# Patient Record
Sex: MALE | Race: BLACK OR AFRICAN AMERICAN | NOT HISPANIC OR LATINO | Employment: FULL TIME | URBAN - METROPOLITAN AREA
[De-identification: names, ages, dates, MRNs, and addresses within clinical notes are randomized per-mention and may not be internally consistent; named-entity substitution may affect disease eponyms.]

---

## 2019-01-23 ENCOUNTER — APPOINTMENT (OUTPATIENT)
Dept: GENERAL RADIOLOGY | Facility: CLINIC | Age: 54
End: 2019-01-23
Payer: COMMERCIAL

## 2019-01-23 ENCOUNTER — APPOINTMENT (OUTPATIENT)
Dept: CT IMAGING | Facility: CLINIC | Age: 54
End: 2019-01-23
Payer: COMMERCIAL

## 2019-01-23 ENCOUNTER — HOSPITAL ENCOUNTER (OUTPATIENT)
Facility: CLINIC | Age: 54
Setting detail: OBSERVATION
Discharge: HOME | End: 2019-01-25
Attending: EMERGENCY MEDICINE | Admitting: HOSPITALIST
Payer: COMMERCIAL

## 2019-01-23 DIAGNOSIS — R55 SYNCOPE, UNSPECIFIED SYNCOPE TYPE: ICD-10-CM

## 2019-01-23 DIAGNOSIS — A09 DIARRHEA OF INFECTIOUS ORIGIN: Primary | ICD-10-CM

## 2019-01-23 LAB
ALBUMIN SERPL-MCNC: 3.7 G/DL (ref 3.4–5)
ALP SERPL-CCNC: 57 U/L (ref 40–150)
ALT SERPL W P-5'-P-CCNC: 44 U/L (ref 0–70)
ANION GAP SERPL CALCULATED.3IONS-SCNC: 6 MMOL/L (ref 3–14)
AST SERPL W P-5'-P-CCNC: 46 U/L (ref 0–45)
BASOPHILS # BLD AUTO: 0 10E9/L (ref 0–0.2)
BASOPHILS NFR BLD AUTO: 0.1 %
BILIRUB DIRECT SERPL-MCNC: 0.2 MG/DL (ref 0–0.2)
BILIRUB SERPL-MCNC: 0.7 MG/DL (ref 0.2–1.3)
BUN SERPL-MCNC: 24 MG/DL (ref 7–30)
CALCIUM SERPL-MCNC: 8.4 MG/DL (ref 8.5–10.1)
CHLORIDE SERPL-SCNC: 107 MMOL/L (ref 94–109)
CO2 SERPL-SCNC: 26 MMOL/L (ref 20–32)
CREAT SERPL-MCNC: 1.42 MG/DL (ref 0.66–1.25)
CRP SERPL-MCNC: 13.2 MG/L (ref 0–8)
D DIMER PPP FEU-MCNC: 0.3 UG/ML FEU (ref 0–0.5)
DIFFERENTIAL METHOD BLD: ABNORMAL
EOSINOPHIL # BLD AUTO: 0.1 10E9/L (ref 0–0.7)
EOSINOPHIL NFR BLD AUTO: 1.6 %
ERYTHROCYTE [DISTWIDTH] IN BLOOD BY AUTOMATED COUNT: 13.6 % (ref 10–15)
ETHANOL SERPL-MCNC: <0.01 G/DL
GFR SERPL CREATININE-BSD FRML MDRD: 56 ML/MIN/{1.73_M2}
GLUCOSE SERPL-MCNC: 95 MG/DL (ref 70–99)
HCT VFR BLD AUTO: 46.4 % (ref 40–53)
HGB BLD-MCNC: 14.6 G/DL (ref 13.3–17.7)
IMM GRANULOCYTES # BLD: 0 10E9/L (ref 0–0.4)
IMM GRANULOCYTES NFR BLD: 0.4 %
LACTATE BLD-SCNC: 1.2 MMOL/L (ref 0.7–2)
LYMPHOCYTES # BLD AUTO: 0.6 10E9/L (ref 0.8–5.3)
LYMPHOCYTES NFR BLD AUTO: 8.6 %
MAGNESIUM SERPL-MCNC: 1.8 MG/DL (ref 1.6–2.3)
MCH RBC QN AUTO: 26.4 PG (ref 26.5–33)
MCHC RBC AUTO-ENTMCNC: 31.5 G/DL (ref 31.5–36.5)
MCV RBC AUTO: 84 FL (ref 78–100)
MONOCYTES # BLD AUTO: 0.4 10E9/L (ref 0–1.3)
MONOCYTES NFR BLD AUTO: 6.3 %
NEUTROPHILS # BLD AUTO: 5.7 10E9/L (ref 1.6–8.3)
NEUTROPHILS NFR BLD AUTO: 83 %
NRBC # BLD AUTO: 0 10*3/UL
NRBC BLD AUTO-RTO: 0 /100
PLATELET # BLD AUTO: 194 10E9/L (ref 150–450)
POTASSIUM SERPL-SCNC: 4.4 MMOL/L (ref 3.4–5.3)
PROT SERPL-MCNC: 7.1 G/DL (ref 6.8–8.8)
RBC # BLD AUTO: 5.52 10E12/L (ref 4.4–5.9)
SODIUM SERPL-SCNC: 139 MMOL/L (ref 133–144)
TROPONIN I SERPL-MCNC: <0.015 UG/L (ref 0–0.04)
WBC # BLD AUTO: 6.9 10E9/L (ref 4–11)

## 2019-01-23 PROCEDURE — 84484 ASSAY OF TROPONIN QUANT: CPT | Performed by: EMERGENCY MEDICINE

## 2019-01-23 PROCEDURE — 85379 FIBRIN DEGRADATION QUANT: CPT | Performed by: EMERGENCY MEDICINE

## 2019-01-23 PROCEDURE — G0378 HOSPITAL OBSERVATION PER HR: HCPCS

## 2019-01-23 PROCEDURE — 99220 ZZC INITIAL OBSERVATION CARE,LEVL III: CPT | Performed by: HOSPITALIST

## 2019-01-23 PROCEDURE — 96361 HYDRATE IV INFUSION ADD-ON: CPT

## 2019-01-23 PROCEDURE — 25000128 H RX IP 250 OP 636: Performed by: EMERGENCY MEDICINE

## 2019-01-23 PROCEDURE — 83735 ASSAY OF MAGNESIUM: CPT | Performed by: EMERGENCY MEDICINE

## 2019-01-23 PROCEDURE — 70450 CT HEAD/BRAIN W/O DYE: CPT

## 2019-01-23 PROCEDURE — 84145 PROCALCITONIN (PCT): CPT | Performed by: EMERGENCY MEDICINE

## 2019-01-23 PROCEDURE — 80076 HEPATIC FUNCTION PANEL: CPT | Performed by: EMERGENCY MEDICINE

## 2019-01-23 PROCEDURE — 86140 C-REACTIVE PROTEIN: CPT | Performed by: EMERGENCY MEDICINE

## 2019-01-23 PROCEDURE — 96360 HYDRATION IV INFUSION INIT: CPT

## 2019-01-23 PROCEDURE — 80320 DRUG SCREEN QUANTALCOHOLS: CPT | Performed by: EMERGENCY MEDICINE

## 2019-01-23 PROCEDURE — 99285 EMERGENCY DEPT VISIT HI MDM: CPT | Mod: 25

## 2019-01-23 PROCEDURE — 93005 ELECTROCARDIOGRAM TRACING: CPT

## 2019-01-23 PROCEDURE — 80048 BASIC METABOLIC PNL TOTAL CA: CPT | Performed by: EMERGENCY MEDICINE

## 2019-01-23 PROCEDURE — 83605 ASSAY OF LACTIC ACID: CPT | Performed by: EMERGENCY MEDICINE

## 2019-01-23 PROCEDURE — 99207 ZZC CDG-CODE CATEGORY CHANGED: CPT | Performed by: HOSPITALIST

## 2019-01-23 PROCEDURE — 71046 X-RAY EXAM CHEST 2 VIEWS: CPT

## 2019-01-23 PROCEDURE — 85025 COMPLETE CBC W/AUTO DIFF WBC: CPT | Performed by: EMERGENCY MEDICINE

## 2019-01-23 RX ORDER — SODIUM CHLORIDE 9 MG/ML
1000 INJECTION, SOLUTION INTRAVENOUS CONTINUOUS
Status: DISCONTINUED | OUTPATIENT
Start: 2019-01-23 | End: 2019-01-24

## 2019-01-23 RX ADMIN — SODIUM CHLORIDE 1000 ML: 9 INJECTION, SOLUTION INTRAVENOUS at 22:22

## 2019-01-23 RX ADMIN — SODIUM CHLORIDE 1000 ML: 9 INJECTION, SOLUTION INTRAVENOUS at 20:00

## 2019-01-23 ASSESSMENT — ENCOUNTER SYMPTOMS
SHORTNESS OF BREATH: 0
LIGHT-HEADEDNESS: 1
DIAPHORESIS: 1
DIZZINESS: 1
FEVER: 0
NAUSEA: 1

## 2019-01-23 NOTE — LETTER
January 25, 2019      Josias Guevara  [address] [id number]        To Whom It May Concern:    Josias Guevara  was seen on 1/25/2019.  Please excuse him during his absence from work on 1/23-1/25/2019 due to illness.        Sincerely,    TERRI Guajardo MD  Hospitalist, Observation Unit

## 2019-01-24 ENCOUNTER — APPOINTMENT (OUTPATIENT)
Dept: CARDIOLOGY | Facility: CLINIC | Age: 54
End: 2019-01-24
Payer: COMMERCIAL

## 2019-01-24 PROBLEM — R55 SYNCOPE: Status: ACTIVE | Noted: 2019-01-24

## 2019-01-24 LAB
ANION GAP SERPL CALCULATED.3IONS-SCNC: 6 MMOL/L (ref 3–14)
BASOPHILS # BLD AUTO: 0 10E9/L (ref 0–0.2)
BASOPHILS NFR BLD AUTO: 0.2 %
BUN SERPL-MCNC: 18 MG/DL (ref 7–30)
C COLI+JEJUNI+LARI FUSA STL QL NAA+PROBE: NOT DETECTED
CALCIUM SERPL-MCNC: 7.8 MG/DL (ref 8.5–10.1)
CHLORIDE SERPL-SCNC: 110 MMOL/L (ref 94–109)
CO2 SERPL-SCNC: 23 MMOL/L (ref 20–32)
CREAT SERPL-MCNC: 1.19 MG/DL (ref 0.66–1.25)
DIFFERENTIAL METHOD BLD: ABNORMAL
EC STX1 GENE STL QL NAA+PROBE: NOT DETECTED
EC STX2 GENE STL QL NAA+PROBE: NOT DETECTED
ENTERIC PATHOGEN COMMENT: ABNORMAL
EOSINOPHIL # BLD AUTO: 0.1 10E9/L (ref 0–0.7)
EOSINOPHIL NFR BLD AUTO: 0.9 %
ERYTHROCYTE [DISTWIDTH] IN BLOOD BY AUTOMATED COUNT: 13.7 % (ref 10–15)
GFR SERPL CREATININE-BSD FRML MDRD: 70 ML/MIN/{1.73_M2}
GLUCOSE SERPL-MCNC: 96 MG/DL (ref 70–99)
HCT VFR BLD AUTO: 40.4 % (ref 40–53)
HGB BLD-MCNC: 12.7 G/DL (ref 13.3–17.7)
IMM GRANULOCYTES # BLD: 0 10E9/L (ref 0–0.4)
IMM GRANULOCYTES NFR BLD: 0.5 %
INTERPRETATION ECG - MUSE: NORMAL
LYMPHOCYTES # BLD AUTO: 1.2 10E9/L (ref 0.8–5.3)
LYMPHOCYTES NFR BLD AUTO: 20.7 %
MCH RBC QN AUTO: 26.2 PG (ref 26.5–33)
MCHC RBC AUTO-ENTMCNC: 31.4 G/DL (ref 31.5–36.5)
MCV RBC AUTO: 83 FL (ref 78–100)
MONOCYTES # BLD AUTO: 0.6 10E9/L (ref 0–1.3)
MONOCYTES NFR BLD AUTO: 10.3 %
NEUTROPHILS # BLD AUTO: 3.8 10E9/L (ref 1.6–8.3)
NEUTROPHILS NFR BLD AUTO: 67.4 %
NOROV GI+II ORF1-ORF2 JNC STL QL NAA+PR: ABNORMAL
NRBC # BLD AUTO: 0 10*3/UL
NRBC BLD AUTO-RTO: 0 /100
PLATELET # BLD AUTO: 160 10E9/L (ref 150–450)
POTASSIUM SERPL-SCNC: 3.9 MMOL/L (ref 3.4–5.3)
PROCALCITONIN SERPL-MCNC: 0.08 NG/ML
RBC # BLD AUTO: 4.85 10E12/L (ref 4.4–5.9)
RVA NSP5 STL QL NAA+PROBE: NOT DETECTED
SALMONELLA SP RPOD STL QL NAA+PROBE: NOT DETECTED
SHIGELLA SP+EIEC IPAH STL QL NAA+PROBE: NOT DETECTED
SODIUM SERPL-SCNC: 139 MMOL/L (ref 133–144)
TROPONIN I SERPL-MCNC: <0.015 UG/L (ref 0–0.04)
V CHOL+PARA RFBL+TRKH+TNAA STL QL NAA+PR: NOT DETECTED
WBC # BLD AUTO: 5.6 10E9/L (ref 4–11)
Y ENTERO RECN STL QL NAA+PROBE: NOT DETECTED

## 2019-01-24 PROCEDURE — 25000132 ZZH RX MED GY IP 250 OP 250 PS 637: Performed by: INTERNAL MEDICINE

## 2019-01-24 PROCEDURE — 25000132 ZZH RX MED GY IP 250 OP 250 PS 637: Performed by: HOSPITALIST

## 2019-01-24 PROCEDURE — 87506 IADNA-DNA/RNA PROBE TQ 6-11: CPT | Performed by: HOSPITALIST

## 2019-01-24 PROCEDURE — 93306 TTE W/DOPPLER COMPLETE: CPT | Mod: 26 | Performed by: INTERNAL MEDICINE

## 2019-01-24 PROCEDURE — 93306 TTE W/DOPPLER COMPLETE: CPT

## 2019-01-24 PROCEDURE — 25000128 H RX IP 250 OP 636: Performed by: HOSPITALIST

## 2019-01-24 PROCEDURE — 99226 ZZC SUBSEQUENT OBSERVATION CARE,LEVEL III: CPT | Performed by: INTERNAL MEDICINE

## 2019-01-24 PROCEDURE — G0378 HOSPITAL OBSERVATION PER HR: HCPCS

## 2019-01-24 PROCEDURE — 84484 ASSAY OF TROPONIN QUANT: CPT | Performed by: HOSPITALIST

## 2019-01-24 PROCEDURE — 80048 BASIC METABOLIC PNL TOTAL CA: CPT | Performed by: HOSPITALIST

## 2019-01-24 PROCEDURE — 36415 COLL VENOUS BLD VENIPUNCTURE: CPT | Performed by: HOSPITALIST

## 2019-01-24 PROCEDURE — 40000893 ZZH STATISTIC PT IP EVAL DEFER: Performed by: PHYSICAL THERAPIST

## 2019-01-24 PROCEDURE — 85025 COMPLETE CBC W/AUTO DIFF WBC: CPT | Performed by: HOSPITALIST

## 2019-01-24 PROCEDURE — 25000128 H RX IP 250 OP 636: Performed by: INTERNAL MEDICINE

## 2019-01-24 RX ORDER — ACETAMINOPHEN 325 MG/1
650 TABLET ORAL EVERY 4 HOURS PRN
Status: DISCONTINUED | OUTPATIENT
Start: 2019-01-24 | End: 2019-01-25 | Stop reason: HOSPADM

## 2019-01-24 RX ORDER — ACETAMINOPHEN 650 MG/1
650 SUPPOSITORY RECTAL EVERY 4 HOURS PRN
Status: DISCONTINUED | OUTPATIENT
Start: 2019-01-24 | End: 2019-01-25 | Stop reason: HOSPADM

## 2019-01-24 RX ORDER — ONDANSETRON 2 MG/ML
4 INJECTION INTRAMUSCULAR; INTRAVENOUS EVERY 6 HOURS PRN
Status: DISCONTINUED | OUTPATIENT
Start: 2019-01-24 | End: 2019-01-25 | Stop reason: HOSPADM

## 2019-01-24 RX ORDER — SODIUM CHLORIDE, SODIUM LACTATE, POTASSIUM CHLORIDE, CALCIUM CHLORIDE 600; 310; 30; 20 MG/100ML; MG/100ML; MG/100ML; MG/100ML
INJECTION, SOLUTION INTRAVENOUS CONTINUOUS
Status: DISCONTINUED | OUTPATIENT
Start: 2019-01-24 | End: 2019-01-25 | Stop reason: HOSPADM

## 2019-01-24 RX ORDER — ONDANSETRON 4 MG/1
4 TABLET, ORALLY DISINTEGRATING ORAL EVERY 6 HOURS PRN
Status: DISCONTINUED | OUTPATIENT
Start: 2019-01-24 | End: 2019-01-25 | Stop reason: HOSPADM

## 2019-01-24 RX ORDER — NALOXONE HYDROCHLORIDE 0.4 MG/ML
.1-.4 INJECTION, SOLUTION INTRAMUSCULAR; INTRAVENOUS; SUBCUTANEOUS
Status: DISCONTINUED | OUTPATIENT
Start: 2019-01-24 | End: 2019-01-25 | Stop reason: HOSPADM

## 2019-01-24 RX ORDER — LACTOBACILLUS RHAMNOSUS GG 10B CELL
1 CAPSULE ORAL
Status: DISCONTINUED | OUTPATIENT
Start: 2019-01-24 | End: 2019-01-25 | Stop reason: HOSPADM

## 2019-01-24 RX ADMIN — SODIUM CHLORIDE, POTASSIUM CHLORIDE, SODIUM LACTATE AND CALCIUM CHLORIDE: 600; 310; 30; 20 INJECTION, SOLUTION INTRAVENOUS at 01:17

## 2019-01-24 RX ADMIN — Medication 1 CAPSULE: at 21:25

## 2019-01-24 RX ADMIN — ACETAMINOPHEN 650 MG: 325 TABLET, FILM COATED ORAL at 16:54

## 2019-01-24 RX ADMIN — ACETAMINOPHEN 650 MG: 325 TABLET, FILM COATED ORAL at 01:31

## 2019-01-24 RX ADMIN — SODIUM CHLORIDE, POTASSIUM CHLORIDE, SODIUM LACTATE AND CALCIUM CHLORIDE: 600; 310; 30; 20 INJECTION, SOLUTION INTRAVENOUS at 08:00

## 2019-01-24 RX ADMIN — SODIUM CHLORIDE, POTASSIUM CHLORIDE, SODIUM LACTATE AND CALCIUM CHLORIDE: 600; 310; 30; 20 INJECTION, SOLUTION INTRAVENOUS at 23:10

## 2019-01-24 ASSESSMENT — MIFFLIN-ST. JEOR: SCORE: 1877.77

## 2019-01-24 NOTE — PLAN OF CARE
"PRIMARY DIAGNOSIS: GASTROENTERITIS/SYNCOPE     OUTPATIENT/OBSERVATION GOALS TO BE MET BEFORE DISCHARGE  1. Orthostatic performed: Yes:          Lying Orthostatic BP: 130/75         Sitting Orthostatic BP: 122/72         Standing Orthostatic BP: 113/74     2. Tolerating PO fluid and/or antibiotics (if applicable): Yes    3. Nausea/Vomiting/Diarrhea symptoms improved: Yes, loose stool x1 this am.     4. Pain status: Pain free.    5. Return to near baseline physical activity: Yes    Discharge Planner Nurse   Safe discharge environment identified: Yes  Barriers to discharge: Yes, Neurology consult        Entered by: Samia Alarcon 01/24/2019 2:30 PM   Pt alert and orientated. VSS. Wanted to eat regular food--order placed. No pain. No diarrhea. Stated \"I feel better\". Will continue to monitor.   Please review provider order for any additional goals.   Nurse to notify provider when observation goals have been met and patient is ready for discharge.  "

## 2019-01-24 NOTE — CONSULTS
Neurology Consult Note  [facility]       [2019]                                                                                       Admission Date: 2019  Hospital Day: 2  Code Status: [unfilled]    Patient: Josias Guevara      : 1965  MRN:  [id number]     CC:      Chief Complaint   Patient presents with     Loss of Consciousness       Consult Request:  Referring Provider:  Nelson Crump MD    Indication for Consultation: Neurology IP Consult: General (non-stroke/non-ICU); Patient to be seen: Routine - within 24 hours; Please eval. re. syncope and CT finding of communicating ventriculomegaly; please review this finding with family, if able.  Thanks, ALEC Guajardo MD.      Primary Care Provider:  Karan Vieira MD        HPI:  Josias Guevara is a 53 year old yo XH male admitted for syncope. It was felt that he was dehydrated, and suffered hypotensive syncope. Admission EKG shows mild bradycardia but no electrophysiological pathology. Pulse at the scene (by EMS) was recorded as bradycardia, somewhat unusual in the setting of acute hypotensive syncope. He underwent head CT on admission to the ER, showing mild generalized enlargement of his ventricular system, but without evidence of acute increased ICP (no transependymal edema at his ventricular horns). I have been asked to explain his head CT findings to him. I previously spoke with Dr. Be in the ER, indicating that the imaging results were likely reflecting a developmental process, found incidentally on his imaging, and not related to his syncope. He denies headache around the time of the syncope, and has no symptoms or findings of infection. He reports intermittent headache in the hospital, occurring when sitting up, resolving lying down, inconsistent with headache arising from increased ICP.    A complete review of symptoms was performed including vascular, infectious, cardiovascular,  "pulmonary, gastrointestinal, endocrinological, hematologic, dermatologic, musculoskeletal, and neurological. All were normal except as above.    CURRENT PROBLEM LIST:  Patient Active Problem List    Diagnosis Date Noted     Syncope 01/24/2019     Priority: Medium     Rhabdomyolysis 07/14/2013     Priority: Medium       PAST MEDICAL HISTORY:  ALLERGIES: No Known Allergies  Tobacco:    History   Smoking Status     Never Smoker   Smokeless Tobacco     Not on file     Alcohol:  Social History    Substance and Sexual Activity      Alcohol use: Not on file    MEDICATIONS:       CURRENTLY SCHEDULED MEDICATIONS          HOME MEDICATIONS  No medications prior to admission.     MEDICAL HISTORY  History reviewed. No pertinent past medical history.  SURGICAL HISTORY  Past Surgical History:   Procedure Laterality Date     ORTHOPEDIC SURGERY      achilles repair     FAMILY HISTORY    No family history on file.  SOCIAL HISTORY  Social History     Socioeconomic History     Marital status:      Spouse name: None     Number of children: None     Years of education: None     Highest education level: None   Social Needs     Financial resource strain: None     Food insecurity - worry: None     Food insecurity - inability: None     Transportation needs - medical: None     Transportation needs - non-medical: None   Occupational History     None   Tobacco Use     Smoking status: Never Smoker   Substance and Sexual Activity     Alcohol use: None     Drug use: None     Sexual activity: None   Other Topics Concern     None   Social History Narrative     None         GENERAL EXAMINATION:    He is a middle-aged , well-developed, well-nourished man, 5' 11\" and 220 lbs 9.6 oz. Blood pressure is 124/74. Peripheral pulses are intact at 68 and regular. He has no carotid bruits. Conjunctiva are normal. His oropharynx is without lesions or inflammation. Skin examination is unremarkable. Nail examination shows no clubbing, cyanosis, or " "deformities. Respiratory examination is unremarkable, with rate of 16, unlabored. He is afebrile.         Height: 180.3 cm (5' 11\")     Temp: 98.8  F (37.1  C)   Weight: 100.1 kg (220 lb 9.6 oz)    Temp src: Oral         BP: 124/74   Heart Rate: 58     Estimated body mass index is 30.77 kg/m  as calculated from the following:    Height as of this encounter: 1.803 m (5' 11\").    Weight as of this encounter: 100.1 kg (220 lb 9.6 oz).    Resp: 16   SpO2: 96 %   O2 Device: None (Room air)     Blood Pressure:   BP Readings from Last 3 Encounters:   19 124/74   13 145/81   09 110/74       T24 : Temp (24hrs), Av.2  F (37.3  C), Min:98.8  F (37.1  C), Max:100.2  F (37.9  C)       NEUROLOGICAL EXAMINATION  Mental Status:  He is awake, alert, and oriented X3. His speech is spontaneous and fluent. He has no aphasia or dysarthria. Short- and long-term memory are intact. Fund of knowledge is appropriate.     Station and Gait: He has a narrow-based gait with free arm swings.     Skull and Spine: His head is atraumatic. His spine is non-tender to palpation. He has no cervical sensory level. He has no tenderness to palpation over his lumbar spine.     Cranial Nerves: Visual fields are full. Extraocular muscles are intact. Pupils are reactive to light. His face is symmetric at rest and with movement. He has no ptosis.  Hearing is intact.    Motor: Muscle bulk is preserved. He has no focal weakness proximally or distally in his arms or legs. He feels that he is at his baseline    Sensory: Sensation is symmetric in his arms and legs.    Coordination: He has no resting, postural, or action tremor.       .  LABORATORY RESULTS      SMA-7:  Recent Labs   Lab Test 19  0631 19  1832 13  0710 07/15/13  0613    139 140 142   POTASSIUM 3.9 4.4 4.2 3.5   CHLORIDE 110* 107 105 105   CO2 23 26 30 30   GLC 96 95 95 104*   BUN 18 24 10 18   CR 1.19 1.42* 1.16 1.32*   ELAINE 7.8* 8.4* 8.1* 7.9*     Recent " Labs   Lab Test 01/23/19  1832   MAG 1.8     No results for input(s): PHOS in the last 27215 hours.1 mg (actual weight)  CMP:  Recent Labs   Lab 01/23/19  1832   PROTTOTAL 7.1   ALBUMIN 3.7   ALKPHOS 57   AST 46*   ALT 44   BILITOTAL 0.7     CBC:    Recent Labs   Lab 01/24/19  0631 01/23/19  1832   WBC 5.6 6.9   RBC 4.85 5.52   HGB 12.7* 14.6   HCT 40.4 46.4   MCV 83 84    194     No results for input(s): IRON, IRONSAT, RETICABSCT, RETP, FEB, NASIM, FOLIC, EPOE, MORPH in the last 168 hours.  U/A:    Recent Labs   Lab Test 07/14/13 2015   COLOR Yellow   APPEARANCE Clear   URINEGLC Negative   URINEBILI Negative   URINEKETONE 10*   SG 1.016   UBLD Trace*   URINEPH 5.5   PROTEIN Negative   NITRITE Negative   LEUKEST Negative   RBCU 0   WBCU 0     No results found for: MICROALBUMIN, CREATCONC    Cholesterol Panel: No results found for: CHOL, HDL, LDL, TRIG, CHOLHDLRATIO, VLDL  Lipase: No results found for: LIPASE  HgA1c: No results found for: A1C  TSH: No results for input(s): TSH in the last 168 hours.  CK: No results found for: CKTOTAL  No results found for: CKTOTAL, CKMB, TROPN  Ammonia:  No lab results found.  Vitamin B12: No lab results found.  Homocysteine: No results found for: HOMOCYSTEINE  Vitamin D: No results for input(s): VITDT in the last 168 hours.  RPR: @LABALLVALUES(RPR:1)@  ESR: No lab results found.  CRP:   Lab Results   Component Value Date    CRP 13.2 01/23/2019     JARED: No lab results found.    ANCA: No lab results found.   No results for input(s): NTBNPI, NTBNP in the last 168 hours.  Recent Labs   Lab 01/24/19  1018   TROPI <0.015     Lab Results   Component Value Date    TROPI <0.015 01/24/2019    TROPI <0.015 01/24/2019    TROPI <0.015 01/24/2019        COAGULATION PANEL  --Lupus anticoagulant:  No results found for: LUPINT  --MTHFR:   No results found for: PATH  --Antithrombin III:  No results found for: ANTCH  --FIbrinogen -  --Factor 7 -  --Factor 8 -  --Protein S free:   No results  found for: PSF  --Protein C:   No results found for: PCCH  --Activated Protein C  No results found for: ARPCR  No results found for: ARPCN  No results found for: APCINT  --Cardiolipin antibodies   No results found for: CARG  No results found for: LISANDRO  --Beta-2 glycoprotein antibodies   No results found for: B2IGG  No results found for: B2IGM  --CRP Cardiac Risk:    Lab Results   Component Value Date    CRP 13.2 (H) 01/23/2019     --Homocysteine:  No results found for: HOMOCYSTEINE  --Methylmalonic acid:  No components found for: METHYLAMOLINC ACID  --Platelet Function P2Y12: No results found for: PRU  --Platelet Function ASA: No results found for: ASA  INR:  No lab results found in last 7 days.    ABG: No lab results found in last 7 days.  Blood Cultures: No results for input(s): CULT in the last 168 hours.    No results found for: CTYP   No results found for: CSFPROTEIN   No components found for: CGLU1     Depakote level:   No lab results found.  Dilantin Level:    No lab results found.  Phenobarbital Level:   No lab results found.  Lamotrigine  Level:    No lab results found.  Lamotrigine Free Level:   No lab results found.  Tegretol level:    No lab results found.  Keppra  Level:    No lab results found.    Ethanol Level: @LABALLVALUES(ETOH:1)@    IMAGING RESULTS   Xr Chest 2 Views    Result Date: 1/23/2019  CHEST TWO VIEWS   1/23/2019 7:29 PM HISTORY: Syncope COMPARISON: None.     IMPRESSION: No acute abnormality. Lungs are well-inflated and clear. Heart size is normal. CONY HARRIS MD    Ct Head W/o Contrast    Result Date: 1/23/2019  CT SCAN OF THE HEAD WITHOUT CONTRAST   1/23/2019 7:28 PM HISTORY: Headache, syncope. TECHNIQUE:  Axial images of the head and coronal reformations without IV contrast material. Radiation dose for this scan was reduced using automated exposure control, adjustment of the mA and/or kV according to patient size, or iterative reconstruction technique. COMPARISON: None. FINDINGS:  The bilateral lateral, third, and fourth ventricles are mildly enlarged out of proportion to the degree of sulcal enlargement. The cerebral hemispheres, brainstem, and cerebellum otherwise demonstrate normal morphology and attenuation. No evidence of acute ischemia, hemorrhage, mass, mass effect, or hydrocephalus. The visualized calvarium, skull base, paranasal sinuses, and extracranial soft tissues are unremarkable.     IMPRESSION: Nonspecific mild communicating ventriculomegaly, age indeterminate. If there is concern for meningitis, lumbar puncture could be pursued. CONY HARRIS MD      Recent Results (from the past 24 hour(s))   CT Head w/o Contrast    Narrative    CT SCAN OF THE HEAD WITHOUT CONTRAST   1/23/2019 7:28 PM     HISTORY: Headache, syncope.    TECHNIQUE:  Axial images of the head and coronal reformations without  IV contrast material. Radiation dose for this scan was reduced using  automated exposure control, adjustment of the mA and/or kV according  to patient size, or iterative reconstruction technique.    COMPARISON: None.    FINDINGS: The bilateral lateral, third, and fourth ventricles are  mildly enlarged out of proportion to the degree of sulcal enlargement.  The cerebral hemispheres, brainstem, and cerebellum otherwise  demonstrate normal morphology and attenuation. No evidence of acute  ischemia, hemorrhage, mass, mass effect, or hydrocephalus. The  visualized calvarium, skull base, paranasal sinuses, and extracranial  soft tissues are unremarkable.      Impression    IMPRESSION: Nonspecific mild communicating ventriculomegaly, age  indeterminate. If there is concern for meningitis, lumbar puncture  could be pursued.    CONY HARRIS MD   XR Chest 2 Views    Narrative    CHEST TWO VIEWS   1/23/2019 7:29 PM     HISTORY: Syncope    COMPARISON: None.      Impression    IMPRESSION: No acute abnormality. Lungs are well-inflated and clear.  Heart size is normal.    CONY HARRIS MD        ____________________________________________________________________________    EKG:        ASSESSMENT     1. Syncope.   2. Mild ventriculomegaly on head CT, likely a developmental finding, not related to his syncope.  3. Bradycardia. Rule out bradyarrhythmia causing/contributing to his syncope.    RECOMMENDATIONS   1. I don't feel that he requires additional imaging at this time.  2. I don't believe his headaches are related to his enlarged ventricles. Increased ICP would cause headaches lying down, relieved sitting up, opposite to his symptoms.  3. He should be safe for discharge from the neurological perspective.   4. Consider outpatient 30 day event monitoring.        Rios Galicia M.D., Ph.D.    [facility]

## 2019-01-24 NOTE — ED PROVIDER NOTES
History     Chief Complaint:  Syncopal Episode    HPI   Josias Guevara is a 53 year old male who presents via EMS after syncopal episode. The patient reports that he was sitting in the [employer] shop when he had sudden onset of dizziness, lightheadedness and diaphoresis towards the end of his haircut. He subsequently had a syncopal episode and EMS was called to transport him to the ED for evaluation. Upon regain consciousness he complained of having some nausea as well. He notes that he has not had anything to eat today, and has only had coffee. He is afebrile here and denies having any chest pain or shortness of breath. Of note, he reports his father had a heart attack in his 40's.    Allergies:  No known drug allergies    Medications:    The patient is currently on no regular medications.    Past Medical History:    Rhabdomyolysis    Past Surgical History:    Achilles repair    Family History:    Heart attack  Cancer  Brain aneurysm      Social History:  The patient presents to the ED via EMS. Family present at bedside.  Marital status:   Smoking status: Never Smoker  Alcohol use: No    Review of Systems   Constitutional: Positive for diaphoresis. Negative for fever.   Respiratory: Negative for shortness of breath.    Cardiovascular: Negative for chest pain.   Gastrointestinal: Positive for nausea.   Neurological: Positive for dizziness, syncope and light-headedness.   All other systems reviewed and are negative.    Physical Exam     Patient Vitals for the past 24 hrs:   BP Temp Temp src Pulse Heart Rate Resp SpO2   01/23/19 2215 134/73 -- -- -- -- -- --   01/23/19 2130 120/76 -- -- 60 -- -- 98 %   01/23/19 2115 123/79 -- -- 64 -- -- --   01/23/19 2112 -- 98.9  F (37.2  C) Oral -- -- -- --   01/23/19 2100 126/76 -- -- 64 -- -- --   01/23/19 2045 126/73 -- -- 65 -- -- --   01/23/19 2030 121/76 -- -- 65 -- -- 97 %   01/23/19 2015 124/76 -- -- 61 -- -- 98 %   01/23/19 2000 113/74 -- -- 62 -- -- 98 %    01/23/19 1915 119/81 -- -- 58 56 12 98 %   01/23/19 1900 126/78 -- -- 60 63 15 99 %   01/23/19 1845 -- -- -- 58 58 (!) 34 100 %   01/23/19 1833 -- 99.7  F (37.6  C) -- -- -- -- --   01/23/19 1830 -- -- -- 59 63 21 98 %   01/23/19 1819 126/80 -- -- -- -- -- --   01/23/19 1816 -- -- -- -- 58 16 98 %   01/23/19 1815 126/80 -- -- 60 -- -- 98 %       Physical Exam  General: Patient is alert and interactive when I enter the room  Head:  The scalp, face, and head appear normal  Eyes:  Conjunctivae are normal, PERRL  ENT:    The nose is normal    Pinnae are normal    External acoustic canals are normal    Dry mucous membranes   Neck:  Trachea midline, good ROM, no nuchal rigidity   CV:  Pulses are normal, RRR  Resp:  No respiratory distress, CTAB   Abdomen:      Soft, non-tender, non-distended  Musc:  Normal muscular tone    No major joint effusions    No asymmetric leg swelling  Skin:  No rash or lesions noted  Neuro:  Speech is normal and fluent. Face is symmetric. Slow to respond. No limb ataxia. Gait intact. No pronator drift.     Moving all extremities well.   Psych: Awake. Alert.  Normal affect.  Appropriate interactions.    Emergency Department Course   ECG (18:18:41):  Rate 59 bpm. HI interval 168. QRS duration 90. QT/QTc 378/374. P-R-T axes 23 12 10. Sinus bradycardia. Otherwise normal ECG. Interpreted at 1828 by Britta Be MD.    Imaging:  Radiographic findings were communicated with the patient who voiced understanding of the findings.    CT HEAD W/O CONTRAST:  IMPRESSION: Nonspecific mild communicating ventriculomegaly, age  indeterminate. If there is concern for meningitis, lumbar puncture  could be pursued.  As read by Radiology.    XR CHEST 2 VIEWS:  IMPRESSION: No acute abnormality. Lungs are well-inflated and clear.  Heart size is normal.  As read by Radiology.     Laboratory:  CBC: WNL (WBC 6.9, HGB 14.6, )  BMP: Creatinine 1.42 (H), GFR Estimate 56 (L), Calcium 8.4 (L), o/w WNL  Troponin:  <0.015  D Dimer: 0.3  Lactic Acid Whole Blood: 1.2    Interventions:  2000: NS 1L IV Bolus  2222: NS 0.9% Infusion 1L IV    Emergency Department Course:  The patient arrived in the emergency department via EMS.    Past medical records, nursing notes, and vitals reviewed.  1829: I performed an exam of the patient and obtained history, as documented above.   IV inserted and blood samples were collected and sent for laboratory testing, findings above.  The patient was sent for a head CT and chest x-ray while in the emergency department, findings above.    2005: I rechecked the patient and explained the findings.    2102: I rechecked the patient.    2118: I spoke to Dr. Rios Galicia at the [facility] regarding the patient.    2130: I updated the patient.    2148: I rechecked the patient.    I spoke to Dr. Crump of the hospitalist service who accepts the patient for admission.     Findings and plan explained to the patient who consents to admission.     Discussed the patient with Dr. Crump, who will admit the patient to an observation bed for further monitoring, evaluation, and treatment.      Impression & Plan      Medical Decision Making:  Josias Guevara is a 53 male who presents with syncope. Patient seemed to have a vasovagal syncope. He had nausea and dizziness before his syncopal episode. His EKG revealed mild sinus bradycardia at 50. Orthostatics were positive as he dropped to 90 SBP with standing. Patient's family are sick with a likely gastroenteritis and patient has had multiple episodes of diarrhea. He did not eat or drink today either. It seems likely that patient was dehydrated and this was his cause of his syncope. CBC was normal but has a mild SOULEYMANE which is likely related to his dehydration. With his family history of his mother having a brain aneurysm, a head CT was done. Patient denied a headache. Head CT report showed possible mild communicating ventriculomegaly which could be  caused by meningitis. However, patient is afebrile, no leukocytosis and no nuchal rigidity. He intermittently report a headache but it was not severe and just mild per patient. I discussed with Neurology the CT findings and stated this is an abnormality that will need follow-up but unlikely to be the cause of his symptoms. If was indeed meningitis, would expect more pronounced symptoms and he likely would be unconscious if he had meninigitis induced hydrocephalus. Patient was tired and wife was concerned that he was not acting normal. He was able to ambulate and laugh with me. With the abnormal CT findings and given that he is not completely back to baseline, patient will be admitted to obs. Patient most likely has dehydration from gastroenteritis causing his syncope.     Diagnosis:    ICD-10-CM    1. Syncope, unspecified syncope type R55        Disposition:  Admitted to observation in the care of Dr. Theron Burger  1/23/2019   [facility] EMERGENCY DEPARTMENT  I, Manisha Burger, am serving as a scribe at 6:29 PM on 1/23/2019 to document services personally performed by Britta Be MD based on my observations and the provider's statements to me.        Britta Be MD  01/24/19 0029

## 2019-01-24 NOTE — H&P
[facility]    History and Physical  Hospitalist     Date of Admission:  1/23/2019  Date of Service (when I saw the patient): 01/23/19  Provider: Nelson Crump MD      Chief Complaint   Fainting episode    History is obtained from the patient, electronic health record and emergency department physician    History of Present Illness   Josias Guevara is a 53 year old male healthy, not on any medications who exercises regularly.  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  He felt nauseated, with dizziness and lost consciousness.  When he woke up he was sweaty, diaphoretic.  He denies any warning signs.  No prior history.  His wife had recently acute diarrhea, his son is also having diarrhea and the patient himself started to have diarrhea today.  He reports of having 5 or 6 episodes of diarrhea since the morning.  He was not able to drink or eat anything, he feels upsetting in his stomach.  He denies fever.  No chest pain, no shortness of breath.  No urinary issues.  From my conversation with emergency department physician, on arrival he was significantly hypotensive in the orthostatic position, unable to tolerate it.  He is receiving IV fluid, feeling better but not at his baseline.  At the moment of my interview he denies any pain.  He is a still feeling weak.  He seems to be somnolent, participate in the interview but falls asleep easily.  His wife accompanying him in the emergency department.  I gather some of the information from her.  His laboratory workup is significant for:  CBC is normal except for absolute lymphocyte count 0.6.  Chemistry significant for creatinine 1.42 glomerular filtration rate 56 calcium 8.4 otherwise sodium, potassium, chloride and bicarbonate are within normal limits.  Lactic acid is normal.  Troponin nondetectable.  Glycemia 95.  D-dimer 0.3.  UA with fasting ketosis, trace of blood.  Ethanol is nondetectable.  CT of the  head.   IMPRESSION: Nonspecific mild communicating ventriculomegaly, age  indeterminate. If there is concern for meningitis, lumbar puncture  could be pursued.  EKG  Sinus bradycardia, rate 59.  Requested and in process pro calcitonin, CRP, hepatic panel and enteric virus or bacterial panel.    Past Medical History    Rhabdomyolysis from overexercising 2013.  Shingles in November 2018.    Assessment & Plan   Josias Guevara is a 53 year old male who presents after an episode of syncope in the setting of ongoing diarrhea.    1.  Syncope.  Suspect related to dehydration in the setting of ongoing diarrhea.  Other causes are not totally excluded though have not been proven.  -Admit to telemetry, complete serial troponin and obtain echocardiogram in the morning.    2.  Acute diarrhea (household contact with similar symptoms).  Suspect viral infectious acute gastroenteritis with moderate to severe dehydration as the main reason behind his symptoms.   -Enteric isolation.  Advance diet as tolerated.  Fluid resuscitation with Ringer lactate.  3.  Acute kidney injury.  Secondary to severe dehydration.   -Fluid resuscitation follow-up BMP in the morning.  4.  Incidental finding of communicating ventriculomegaly.  No clear causal relationship with his current presentation.  This may need further follow-up in the outpatient.    Code Status   Full Code    Primary Care Physician   Karan Vieira      Past Surgical History   I have reviewed this patient's surgical history and updated it with pertinent information if needed.  Past Surgical History:   Procedure Laterality Date     ORTHOPEDIC SURGERY      achilles repair       Prior to Admission Medications   None     Allergies   No Known Allergies    Social History   I have personally reviewed the social history with the patient showing.  Social History     Tobacco Use     Smoking status: Never Smoker   Substance Use Topics     Alcohol use: Not on file     Family History    Father  from heart attack, mother had brain aneurysm.      Review of Systems   Except as noted in the HPI, a 12-system Review of Systems was found to be negative.      Physical Exam   Vital Signs with Ranges  Temp:  [98.9  F (37.2  C)-99.7  F (37.6  C)] 98.9  F (37.2  C)  Pulse:  [58-65] 60  Heart Rate:  [56-63] 56  Resp:  [12-34] 12  BP: (113-134)/(73-81) 134/73  SpO2:  [97 %-100 %] 98 %  0 lbs 0 oz    GEN:  Somnolent, cooperative oriented x 3, appears comfortable, NAD.  HEENT:  Normocephalic/atraumatic, no scleral icterus, no nasal discharge, mouth moist.  CV:  Regular rate and rhythm, no murmur or JVD.  S1 + S2 noted, no S3 or S4.  LUNGS:  Clear to auscultation bilaterally without rales/rhonchi/wheezing/retractions.  Symmetric chest rise on inhalation noted.  ABD:  Active bowel sounds, soft, non-tender/non-distended.  No rebound/guarding/rigidity.  EXT:  No edema or cyanosis.  No joint synovitis noted.  SKIN:  Dry to touch, no exanthems noted in the visualized areas.  NEURO: Follows commands, speech is articulated and motility preserved. No nuchal stiffness or meningeal signs.     Data   I personally reviewed the EKG tracing showing Sinus bradycardia, heart rate 59..  Results for orders placed or performed during the hospital encounter of 19 (from the past 24 hour(s))   EKG 12 lead   Result Value Ref Range    Interpretation ECG Click View Image link to view waveform and result    Troponin I   Result Value Ref Range    Troponin I ES <0.015 0.000 - 0.045 ug/L   CBC with platelets differential   Result Value Ref Range    WBC 6.9 4.0 - 11.0 10e9/L    RBC Count 5.52 4.4 - 5.9 10e12/L    Hemoglobin 14.6 13.3 - 17.7 g/dL    Hematocrit 46.4 40.0 - 53.0 %    MCV 84 78 - 100 fl    MCH 26.4 (L) 26.5 - 33.0 pg    MCHC 31.5 31.5 - 36.5 g/dL    RDW 13.6 10.0 - 15.0 %    Platelet Count 194 150 - 450 10e9/L    Diff Method Automated Method     % Neutrophils 83.0 %    % Lymphocytes 8.6 %    % Monocytes 6.3 %    %  Eosinophils 1.6 %    % Basophils 0.1 %    % Immature Granulocytes 0.4 %    Nucleated RBCs 0 0 /100    Absolute Neutrophil 5.7 1.6 - 8.3 10e9/L    Absolute Lymphocytes 0.6 (L) 0.8 - 5.3 10e9/L    Absolute Monocytes 0.4 0.0 - 1.3 10e9/L    Absolute Eosinophils 0.1 0.0 - 0.7 10e9/L    Absolute Basophils 0.0 0.0 - 0.2 10e9/L    Abs Immature Granulocytes 0.0 0 - 0.4 10e9/L    Absolute Nucleated RBC 0.0    Basic metabolic panel   Result Value Ref Range    Sodium 139 133 - 144 mmol/L    Potassium 4.4 3.4 - 5.3 mmol/L    Chloride 107 94 - 109 mmol/L    Carbon Dioxide 26 20 - 32 mmol/L    Anion Gap 6 3 - 14 mmol/L    Glucose 95 70 - 99 mg/dL    Urea Nitrogen 24 7 - 30 mg/dL    Creatinine 1.42 (H) 0.66 - 1.25 mg/dL    GFR Estimate 56 (L) >60 mL/min/[1.73_m2]    GFR Estimate If Black 65 >60 mL/min/[1.73_m2]    Calcium 8.4 (L) 8.5 - 10.1 mg/dL   Lactic acid whole blood   Result Value Ref Range    Lactic Acid 1.2 0.7 - 2.0 mmol/L   D dimer quantitative   Result Value Ref Range    D Dimer 0.3 0.0 - 0.50 ug/ml FEU   Alcohol ethyl   Result Value Ref Range    Ethanol g/dL <0.01 <0.01 g/dL   Hepatic panel   Result Value Ref Range    Bilirubin Direct 0.2 0.0 - 0.2 mg/dL    Bilirubin Total 0.7 0.2 - 1.3 mg/dL    Albumin 3.7 3.4 - 5.0 g/dL    Protein Total 7.1 6.8 - 8.8 g/dL    Alkaline Phosphatase 57 40 - 150 U/L    ALT 44 0 - 70 U/L    AST 46 (H) 0 - 45 U/L   Magnesium   Result Value Ref Range    Magnesium 1.8 1.6 - 2.3 mg/dL   CRP inflammation   Result Value Ref Range    CRP Inflammation 13.2 (H) 0.0 - 8.0 mg/L   CT Head w/o Contrast    Narrative    CT SCAN OF THE HEAD WITHOUT CONTRAST   1/23/2019 7:28 PM     HISTORY: Headache, syncope.    TECHNIQUE:  Axial images of the head and coronal reformations without  IV contrast material. Radiation dose for this scan was reduced using  automated exposure control, adjustment of the mA and/or kV according  to patient size, or iterative reconstruction technique.    COMPARISON:  None.    FINDINGS: The bilateral lateral, third, and fourth ventricles are  mildly enlarged out of proportion to the degree of sulcal enlargement.  The cerebral hemispheres, brainstem, and cerebellum otherwise  demonstrate normal morphology and attenuation. No evidence of acute  ischemia, hemorrhage, mass, mass effect, or hydrocephalus. The  visualized calvarium, skull base, paranasal sinuses, and extracranial  soft tissues are unremarkable.      Impression    IMPRESSION: Nonspecific mild communicating ventriculomegaly, age  indeterminate. If there is concern for meningitis, lumbar puncture  could be pursued.    CONY HARRIS MD   XR Chest 2 Views    Narrative    CHEST TWO VIEWS   1/23/2019 7:29 PM     HISTORY: Syncope    COMPARISON: None.      Impression    IMPRESSION: No acute abnormality. Lungs are well-inflated and clear.  Heart size is normal.    CONY HARRIS MD       Disclaimer: This note consists of symbols derived from keyboarding, dictation and/or voice recognition software. As a result, there may be errors in the script that have gone undetected. Please consider this when interpreting information found in this chart.

## 2019-01-24 NOTE — ED NOTES
Bed: ED09  Expected date: 1/23/19  Expected time: 5:59 PM  Means of arrival:   Comments:  Irene 533  59 M, syncope

## 2019-01-24 NOTE — PLAN OF CARE
PT: Orders received. Per discussion with care team during rounding, the pt does not have any skilled IPPT needs. Will complete orders.

## 2019-01-24 NOTE — PLAN OF CARE
PRIMARY DIAGNOSIS: GASTROENTERITIS    OUTPATIENT/OBSERVATION GOALS TO BE MET BEFORE DISCHARGE  1. Orthostatic performed: Yes:          Lying Orthostatic BP: 130/75         Sitting Orthostatic BP: 122/72         Standing Orthostatic BP: 113/74     2. Tolerating PO fluid and/or antibiotics (if applicable): Yes    3. Nausea/Vomiting/Diarrhea symptoms improved: No    4. Pain status: Pain free.    5. Return to near baseline physical activity: Yes    Discharge Planner Nurse   Safe discharge environment identified: Yes  Barriers to discharge: Yes       Entered by: Susan Patterson 01/24/2019 5:25 AM     A&Ox4, VSS. Orthos completed. Denies any pain/nausea. LBM last night at admission. Complete echo in AM. SW/PT/OT consults. Independent in room. Will continue to monitor.   Please review provider order for any additional goals.   Nurse to notify provider when observation goals have been met and patient is ready for discharge.

## 2019-01-24 NOTE — CONSULTS
Discharge Planner   Discharge Plans in progress: Yes. Per care team rounds, no identified care transition needs.   Barriers to discharge plan: Medical stability.   Follow up plan: CM available for any further discharge planning or needs.        Entered by: Lo Kern 01/24/2019 9:45 AM

## 2019-01-24 NOTE — PLAN OF CARE
ROOM # 220    Living Situation (if not independent, order SW consult): lives with spouse  Facility name:  : [facility]    Activity level at baseline: ind  Activity level on admit: call for assistance, fall risk, does ambulate independently      Patient registered to observation; given Patient Bill of Rights; given the opportunity to ask questions about observation status and their plan of care.  Patient has been oriented to the observation room, bathroom and call light is in place.    Discussed discharge goals and expectations with patient/family.

## 2019-01-24 NOTE — PLAN OF CARE
"PRIMARY DIAGNOSIS: SYNCOPE/gastroenteritis  OUTPATIENT/OBSERVATION GOALS TO BE MET BEFORE DISCHARGE:  1. Orthostatic performed: Yes:          Lying Orthostatic BP: 130/75         Sitting Orthostatic BP: 122/72         Standing Orthostatic BP: 113/74     2. Diagnostic testing complete & at baseline neurologic testing: Yes, troponins <0.015 x 4    3. Cleared by consultants (if involved): No, neurology saw this afternoon    4. Interpretation of cardiac rhythm per telemetry tech: SR/SB HR 50-60    5. Tolerating adequate PO diet and medications: Yes, tolerated regular diet.    6. Return to near baseline physical activity or neurologic status: Yes, independent for activity    Discharge Planner Nurse   Safe discharge environment identified: Yes  Barriers to discharge: Yes       Entered by: Zohra Sanders 01/24/2019 4:44 PM     Please review provider order for any additional goals.   Nurse to notify provider when observation goals have been met and patient is ready for discharge.    /88 (BP Location: Right arm)   Pulse 68   Temp 96.4  F (35.8  C) (Oral)   Resp 20   Ht 1.803 m (5' 11\")   Wt 100.1 kg (220 lb 9.6 oz)   SpO2 97%   BMI 30.77 kg/m      Orientation:  A&Ox4  Pain status:  reported dull headache 3/10  Neuro:  in tact, denies numbness/tingling  Activity: independent in room, SBA outside room. Pt ambulated in hallway SBA with no difficulties. Denied pain, dizziness, or lightheadedness when doing so.  Lungs: clear on auscultation, no SOB assessed  Cardiac: WNL - tele: SR/SB HR 50-60s  :  WNL  GI:  Pt continues to have loose stools, denies abd pain or discomfort  IVF:  LR 100mL/hr  Meds: PRN tylenol given for dull headache  Diet: Regular - tolerating okay  Labs: enteric panel pending   Consults: neurology, see note  "

## 2019-01-24 NOTE — PROGRESS NOTES
[facility]    Hospitalist Progress Note    Date of Service (when I saw the patient): 01/24/2019    Assessment & Plan   Josias Guevara is a pleasant 53 year old gentleman who presented 1/23 after an episode of syncope in the setting of ongoing diarrhea.  Current problems include:     1. Syncope.  Likely related to dehydration in the setting of ongoing diarrhea.  Other causes are not totally excluded (bradycardia or possible bradyarrhythmia).  Still having orthostatic Sx today.  - continue current monitoring; reviewed care w/ Dr. VALERIE Galicia.  - appreciate Neurology consult.  - continue telemetry  - recheck orthostatic VS in a.m.    2. Acute diarrhea (household contact with similar symptoms); still having multiple loose stools today.  Suspect viral infectious acute gastroenteritis.  - continue Enteric isolation  - Advance diet as tolerated  - continue current IVF  - follow up on stool studies when results available  - if persists overnight, will check for C. Diff.  - trial of Lactinex; start today    3. Acute kidney injury, attributted to severe dehydration; Cr improved.  - f/u BMP in a.m.  - continue current IVF until orthostatic Sx have resolved    4. Incidental finding of communicating ventriculomegaly; reviewed w/ Neurology.  - no further eval. At present.    5. Headache, mild.  - continue tylenol PRN    6. Relative bradycardia; unclear cause.  - telemetry  - 30-day event monitor after discharge    7. Anemia; mild.  - recheck Hgb in a.m.      DVT Prophylaxis: Pneumatic Compression Devices and Ambulate every shift  Code Status: Full Code    Disposition: Expected discharge in next 1-2 days.      TERRI Guajardo MD, [facility]     Internal Medicine Hospitalist  Text Page (7am - 6pm)    Interval History   No new issues today; no further syncopal episodes. Has still had multiple loose/semi-formed BM's today.  Some lightheadedness when sitting up in bed; not out of bed much today.  Tolerating full liq. Diet.  No  f/c/SOB; no chest or abdom. Pain. + headache when sitting up in bed.  Eric, his wife and their son have all had diarrheal Sx during the past 2 or more days.  No recent travel.  His wife's father is considered immunocompromised (due to MDS) and lives with Eric and his wife in their home.  Reviewed care w Eric and his wife, w/ his RN and w/ Dr. Galicia of Neurology.    -Data reviewed today: I reviewed all new labs and imaging results over the last 24 hours. I personally reviewed no images or EKG's today.    Physical Exam   Temp: 96.4  F (35.8  C) Temp src: Oral BP: 146/88 Pulse: 68 Heart Rate: 55 Resp: 20 SpO2: 97 % O2 Device: None (Room air)    Vitals:    01/24/19 0101   Weight: 100.1 kg (220 lb 9.6 oz)     Vital Signs with Ranges  Temp:  [96.4  F (35.8  C)-100.2  F (37.9  C)] 96.4  F (35.8  C)  Pulse:  [58-68] 68  Heart Rate:  [55-63] 55  Resp:  [12-34] 20  BP: (106-146)/(54-88) 146/88  SpO2:  [95 %-100 %] 97 %  No intake/output data recorded.    Constitutional: no acute distress; lying in bed, then later up walking in [place].  HEENT: AT/NC. Sclerae clear; EOMI. MM's moist.  Respiratory: Good air entry bilaterally, no wheezing or rhonchi  Cardiovascular: S1, S2 present, regular rate and rhythm, without murmur, rubs or gallops  GI: abd. Flat; positive bowel sounds; soft, non-tender, non-distended and no masses  Skin/Integumen: no edema, no cyanosis, no rashes  Neurologic: awake, conversant; no focal deficits    Medications     lactated ringers 100 mL/hr at 01/24/19 1438         Data   Recent Labs   Lab 01/24/19  1018 01/24/19  0631 01/24/19  0206 01/23/19  1832   WBC  --  5.6  --  6.9   HGB  --  12.7*  --  14.6   MCV  --  83  --  84   PLT  --  160  --  194   NA  --  139  --  139   POTASSIUM  --  3.9  --  4.4   CHLORIDE  --  110*  --  107   CO2  --  23  --  26   BUN  --  18  --  24   CR  --  1.19  --  1.42*   ANIONGAP  --  6  --  6   ELAINE  --  7.8*  --  8.4*   GLC  --  96  --  95   ALBUMIN  --   --   --  3.7   PROTTOTAL   --   --   --  7.1   BILITOTAL  --   --   --  0.7   ALKPHOS  --   --   --  57   ALT  --   --   --  44   AST  --   --   --  46*   TROPI <0.015 <0.015 <0.015 <0.015       Recent Results (from the past 24 hour(s))   CT Head w/o Contrast    Narrative    CT SCAN OF THE HEAD WITHOUT CONTRAST   1/23/2019 7:28 PM     HISTORY: Headache, syncope.    TECHNIQUE:  Axial images of the head and coronal reformations without  IV contrast material. Radiation dose for this scan was reduced using  automated exposure control, adjustment of the mA and/or kV according  to patient size, or iterative reconstruction technique.    COMPARISON: None.    FINDINGS: The bilateral lateral, third, and fourth ventricles are  mildly enlarged out of proportion to the degree of sulcal enlargement.  The cerebral hemispheres, brainstem, and cerebellum otherwise  demonstrate normal morphology and attenuation. No evidence of acute  ischemia, hemorrhage, mass, mass effect, or hydrocephalus. The  visualized calvarium, skull base, paranasal sinuses, and extracranial  soft tissues are unremarkable.      Impression    IMPRESSION: Nonspecific mild communicating ventriculomegaly, age  indeterminate. If there is concern for meningitis, lumbar puncture  could be pursued.    CONY HARRIS MD   XR Chest 2 Views    Narrative    CHEST TWO VIEWS   1/23/2019 7:29 PM     HISTORY: Syncope    COMPARISON: None.      Impression    IMPRESSION: No acute abnormality. Lungs are well-inflated and clear.  Heart size is normal.    CONY HARRIS MD

## 2019-01-24 NOTE — ED NOTES
[facility]  ED Nurse Handoff Report    Josias Guevara is a 53 year old male   ED Chief complaint: Loss of Consciousness  . ED Diagnosis:   Final diagnoses:   Syncope, unspecified syncope type     Allergies: No Known Allergies    Code Status: Full Code  Activity level - Baseline/Home:  Independent. Activity Level - Current:   Stand with Assist. Lift room needed: No. Bariatric: No   Needed: No   Isolation: No. Infection: Not Applicable.     Vital Signs:   Vitals:    01/23/19 2112 01/23/19 2115 01/23/19 2130 01/23/19 2215   BP:  123/79 120/76 134/73   Pulse:  64 60    Resp:       Temp: 98.9  F (37.2  C)      TempSrc: Oral      SpO2:   98%        Cardiac Rhythm:  ,      Pain level: 0-10 Pain Scale: 0  Patient confused: No. Patient Falls Risk: Yes.   Elimination Status: Has voided   Patient Report - Initial Complaint: syncope. Focused Assessment:  53 year old male who presents via EMS after syncopal episode. The patient reports that he was sitting in the [employer] shop when he had sudden onset of dizziness, lightheadedness and diaphoresis towards the end of his haircut. He subsequently had a syncopal episode and EMS was called to transport him to the ED for evaluation. Upon regain consciousness he complained of having some nausea as well. He notes that he has not had anything to eat today, and has only had coffee. He is afebrile here and denies having any chest pain or shortness of breath. Of note, he reports his father had a heart attack in his 40's.     Allergies:  No known drug allergies     Medications:    The patient is currently on no regular medications.     Past Medical History:    Rhabdomyolysis     Past Surgical History:    Achilles repair     Family History:    Heart attack  Cancer  Brain aneurysm       Social History:  The patient presents to the ED via EMS. Family present at bedside.  Marital status:   Smoking status: Never Smoker  Alcohol use: No     Review of Systems    Constitutional: Positive for diaphoresis. Negative for fever.   Respiratory: Negative for shortness of breath.    Cardiovascular: Negative for chest pain.   Gastrointestinal: Positive for nausea.   Neurological: Positive for dizziness, syncope and light-headedness.   All other systems reviewed and are negative.      Tests Performed: .  Labs Ordered and Resulted from Time of ED Arrival Up to the Time of Departure from the ED   CBC WITH PLATELETS DIFFERENTIAL - Abnormal; Notable for the following components:       Result Value    MCH 26.4 (*)     Absolute Lymphocytes 0.6 (*)     All other components within normal limits   BASIC METABOLIC PANEL - Abnormal; Notable for the following components:    Creatinine 1.42 (*)     GFR Estimate 56 (*)     Calcium 8.4 (*)     All other components within normal limits   TROPONIN I   LACTIC ACID WHOLE BLOOD   D DIMER QUANTITATIVE   ALCOHOL ETHYL   HEPATIC PANEL   MAGNESIUM     CT Head w/o Contrast   Final Result   IMPRESSION: Nonspecific mild communicating ventriculomegaly, age   indeterminate. If there is concern for meningitis, lumbar puncture   could be pursued.      CONY HARRIS MD      XR Chest 2 Views   Final Result   IMPRESSION: No acute abnormality. Lungs are well-inflated and clear.   Heart size is normal.      CONY HARRIS MD          . Abnormal Results: none  Treatments provided:   Medications   0.9% sodium chloride BOLUS (0 mLs Intravenous Stopped 1/23/19 2118)     Followed by   sodium chloride 0.9% infusion (1,000 mLs Intravenous New Bag 1/23/19 2222)       Family Comments: wife present  OBS brochure/video discussed/provided to patient:  Yes  ED Medications:   Medications   0.9% sodium chloride BOLUS (0 mLs Intravenous Stopped 1/23/19 2118)     Followed by   sodium chloride 0.9% infusion (1,000 mLs Intravenous New Bag 1/23/19 2222)     Drips infusing:  No  For the majority of the shift, the patient's behavior Green. Interventions performed were NA.     Severe  Sepsis OR Septic Shock Diagnosis Present: NO      ED Nurse Name/Phone Number: YOLY KHAN,   10:45 PM  RECEIVING UNIT ED HANDOFF REVIEW    Above ED Nurse Handoff Report was reviewed: Yes  Reviewed by: Meagan Srivastava on January 24, 2019 at 12:36 AM

## 2019-01-24 NOTE — PLAN OF CARE
PRIMARY DIAGNOSIS: GASTROENTERITIS/ Syncope    OUTPATIENT/OBSERVATION GOALS TO BE MET BEFORE DISCHARGE  1. Orthostatic performed: No, will be done, patient wanting to rest at this time    2. Tolerating PO fluid and/or antibiotics (if applicable): able to take sips of water without nausea    3. Nausea/Vomiting/Diarrhea symptoms improved: No,  watery    4. Pain status: denies abdominal pain, states does have a headache     5. Return to near baseline physical activity: Yes, but have informed patient that he is fall risk and needs to ask for assistance when getting up    Discharge Planner Nurse   Safe discharge environment identified: Yes  Barriers to discharge: Yes       Entered by: Meagan Srivastava 01/24/2019 2:21 AM     Please review provider order for any additional goals.   Nurse to notify provider when observation goals have been met and patient is ready for discharge.

## 2019-01-24 NOTE — PLAN OF CARE
OT: Order received, chart reviewed. Per discussion with care team, pt does not have skilled OT needs. Up independently, near baseline. Will complete IP OT orders.

## 2019-01-24 NOTE — PLAN OF CARE
PRIMARY DIAGNOSIS: GASTROENTERITIS/SYNCOPE     OUTPATIENT/OBSERVATION GOALS TO BE MET BEFORE DISCHARGE  1. Orthostatic performed: Yes:          Lying Orthostatic BP: 130/75         Sitting Orthostatic BP: 122/72         Standing Orthostatic BP: 113/74     2. Tolerating PO fluid and/or antibiotics (if applicable): Yes    3. Nausea/Vomiting/Diarrhea symptoms improved: No,  watery    4. Pain status: Pain free.    5. Return to near baseline physical activity: Yes    Discharge Planner Nurse   Safe discharge environment identified: Yes  Barriers to discharge: Yes       Entered by: Samia Alarcon 01/24/2019 12:35 PM   Pt alert and orientated. VSS. Denied a headache or pain. Loose stool noted on assessment. Tolerating clears. IVF running. Denied dizziness or nausea while walking. Will continue to monitor.   Please review provider order for any additional goals.   Nurse to notify provider when observation goals have been met and patient is ready for discharge.

## 2019-01-24 NOTE — PHARMACY-ADMISSION MEDICATION HISTORY
Admission medication history interview status for this patient is complete. See [facility] admission navigator for allergy information, prior to admission medications and immunization status.     Medication history interview source(s):Patient and Family  Medication history resources (including written lists, pill bottles, clinic record):None  Primary pharmacy:-    Changes made to PTA medication list:  Added: -  Deleted: -  Changed: -    Actions taken by pharmacist (provider contacted, etc):None     Additional medication history information:None    Medication reconciliation/reorder completed by provider prior to medication history? No    Do you take OTC medications (eg tylenol, ibuprofen, fish oil, eye/ear drops, etc)?   no(Y/N)    For patients on insulin therapy: no (Y/N)  Lantus/levemir/NPH/Mix 70/30 dose:   (Y/N) (see Med list for doses)   Sliding scale Novolog Y/N  If Yes, do you have a baseline novolog pre-meal dose:  units with meals  Patients eat three meals a day:   Y/N    How many episodes of hypoglycemia do you have per week: _______  How many missed doses do you have per week: ______  How many times do you check your blood glucose per day: _______  Do you have a Continuous glucose monitor (CGM)   Y/N (remind pt that not approved for hospital use)   Any Barriers to therapy - Be specific :  cost of medications, comfortable with giving injections (if applicable), comfortable and confident with current diabetes regimen: Y/N ______________      Prior to Admission medications    Not on File

## 2019-01-24 NOTE — ED TRIAGE NOTES
Pt presents to ED via EMS after syncopal episode in chair at [employer] this evening. Pt states that he started to feel dizzy around 1pm today.  per EMS. ABC intact. A/O x4.    Pt states that he continues to feel dizzy in the ED.

## 2019-01-25 VITALS
WEIGHT: 220.6 LBS | HEIGHT: 71 IN | OXYGEN SATURATION: 97 % | HEART RATE: 68 BPM | RESPIRATION RATE: 16 BRPM | BODY MASS INDEX: 30.88 KG/M2 | SYSTOLIC BLOOD PRESSURE: 144 MMHG | TEMPERATURE: 98.3 F | DIASTOLIC BLOOD PRESSURE: 86 MMHG

## 2019-01-25 LAB
ANION GAP SERPL CALCULATED.3IONS-SCNC: 4 MMOL/L (ref 3–14)
BUN SERPL-MCNC: 19 MG/DL (ref 7–30)
CALCIUM SERPL-MCNC: 8.4 MG/DL (ref 8.5–10.1)
CHLORIDE SERPL-SCNC: 108 MMOL/L (ref 94–109)
CO2 SERPL-SCNC: 28 MMOL/L (ref 20–32)
CREAT SERPL-MCNC: 1.29 MG/DL (ref 0.66–1.25)
GFR SERPL CREATININE-BSD FRML MDRD: 63 ML/MIN/{1.73_M2}
GLUCOSE SERPL-MCNC: 97 MG/DL (ref 70–99)
HGB BLD-MCNC: 13.3 G/DL (ref 13.3–17.7)
POTASSIUM SERPL-SCNC: 4.1 MMOL/L (ref 3.4–5.3)
SODIUM SERPL-SCNC: 140 MMOL/L (ref 133–144)

## 2019-01-25 PROCEDURE — 85018 HEMOGLOBIN: CPT | Performed by: INTERNAL MEDICINE

## 2019-01-25 PROCEDURE — 25000132 ZZH RX MED GY IP 250 OP 250 PS 637: Performed by: INTERNAL MEDICINE

## 2019-01-25 PROCEDURE — 99217 ZZC OBSERVATION CARE DISCHARGE: CPT | Performed by: INTERNAL MEDICINE

## 2019-01-25 PROCEDURE — 25000128 H RX IP 250 OP 636: Performed by: INTERNAL MEDICINE

## 2019-01-25 PROCEDURE — G0378 HOSPITAL OBSERVATION PER HR: HCPCS

## 2019-01-25 PROCEDURE — 80048 BASIC METABOLIC PNL TOTAL CA: CPT | Performed by: INTERNAL MEDICINE

## 2019-01-25 PROCEDURE — 36415 COLL VENOUS BLD VENIPUNCTURE: CPT | Performed by: INTERNAL MEDICINE

## 2019-01-25 RX ORDER — LACTOBACILLUS RHAMNOSUS GG 10B CELL
1 CAPSULE ORAL 3 TIMES DAILY PRN
Qty: 21 CAPSULE | Refills: 1 | Status: SHIPPED | OUTPATIENT
Start: 2019-01-25 | End: 2019-02-01

## 2019-01-25 RX ORDER — LACTOBACILLUS RHAMNOSUS GG 10B CELL
1 CAPSULE ORAL
Qty: 90 CAPSULE | Refills: 0 | Status: SHIPPED | OUTPATIENT
Start: 2019-01-25 | End: 2019-01-25

## 2019-01-25 RX ADMIN — Medication 1 CAPSULE: at 13:56

## 2019-01-25 RX ADMIN — Medication 1 CAPSULE: at 08:52

## 2019-01-25 RX ADMIN — SODIUM CHLORIDE, POTASSIUM CHLORIDE, SODIUM LACTATE AND CALCIUM CHLORIDE: 600; 310; 30; 20 INJECTION, SOLUTION INTRAVENOUS at 08:54

## 2019-01-25 NOTE — PLAN OF CARE
PRIMARY DIAGNOSIS: SYNCOPE/gastroenteritis  OUTPATIENT/OBSERVATION GOALS TO BE MET BEFORE DISCHARGE:  1. Orthostatic performed: Yes:          Lying Orthostatic BP: 130/75         Sitting Orthostatic BP: 122/72         Standing Orthostatic BP: 113/74     2. Diagnostic testing complete & at baseline neurologic testing: Yes, trops negative x 4    3. Cleared by consultants (if involved): No    4. Interpretation of cardiac rhythm per telemetry tech: SR/SB HR 50-60    5. Tolerating adequate PO diet and medications: Yes    6. Return to near baseline physical activity or neurologic status: Yes    Discharge Planner Nurse   Safe discharge environment identified: Yes  Barriers to discharge: Yes        Entered by: Susan Patterson 01/25/2019 5:04 AM       A&Ox4, VSS. Denies pain. Up independent in room. IVF infusing. Will continue to monitor.     Please review provider order for any additional goals.   Nurse to notify provider when observation goals have been met and patient is ready for discharge.

## 2019-01-25 NOTE — PLAN OF CARE
PRIMARY DIAGNOSIS: SYNCOPE & Noro Virus  OUTPATIENT/OBSERVATION GOALS TO BE MET BEFORE DISCHARGE:  1. Orthostatic performed: No    2. Diagnostic testing complete & at baseline neurologic testing: Yes    3. Cleared by consultants (if involved): Yes    4. Interpretation of cardiac rhythm per telemetry tech: Report of SR, pt had tele off for a shower and will be discharging soon    5. Tolerating adequate PO diet and medications: Yes    6. Return to near baseline physical activity or neurologic status: Yes    VSS on RA. A&Ox4. Reports only having one loose BM today, denies nausea and is tolerating a regular diet. Enteric precautions maintained. Plan: possible discharge    Discharge Planner Nurse   Safe discharge environment identified: Yes  Barriers to discharge: No       Entered by: Cinthia Bowen 01/25/2019 4:12 PM     Please review provider order for any additional goals.   Nurse to notify provider when observation goals have been met and patient is ready for discharge.

## 2019-01-25 NOTE — PLAN OF CARE
PRIMARY DIAGNOSIS: GASTROENTERITIS/NOROVIRUS    OUTPATIENT/OBSERVATION GOALS TO BE MET BEFORE DISCHARGE  1. Orthostatic performed: Yes:          Lying Orthostatic BP: 162/85         Sitting Orthostatic BP: 154/88         Standing Orthostatic BP: 164/103     2. Tolerating PO fluid and/or antibiotics (if applicable): Yes    3. Nausea/Vomiting/Diarrhea symptoms improved: Yes, 1 loose stool on shift.     4. Pain status: Pain free.    5. Return to near baseline physical activity: Yes    Discharge Planner Nurse   Safe discharge environment identified: Yes  Barriers to discharge: No       Entered by: Samia Alarcon 01/25/2019 1:52 PM   Pt alert and orientated. VSS. 1 loose stool on shift. Tolerating a regular diet. Up independently. Ready to go home.   Please review provider order for any additional goals.   Nurse to notify provider when observation goals have been met and patient is ready for discharge.

## 2019-01-25 NOTE — PROVIDER NOTIFICATION
[facility] Paged hospitalist.

## 2019-01-25 NOTE — PLAN OF CARE
"PRIMARY DIAGNOSIS: SYNCOPE/gastroenteritis  OUTPATIENT/OBSERVATION GOALS TO BE MET BEFORE DISCHARGE:  1. Orthostatic performed: Yes:          Lying Orthostatic BP: 130/75         Sitting Orthostatic BP: 122/72         Standing Orthostatic BP: 113/74     2. Diagnostic testing complete & at baseline neurologic testing: Yes, trops negative x 4    3. Cleared by consultants (if involved): No    4. Interpretation of cardiac rhythm per telemetry tech: SR/SB HR 50-60    5. Tolerating adequate PO diet and medications: Yes    6. Return to near baseline physical activity or neurologic status: Yes    Discharge Planner Nurse   Safe discharge environment identified: Yes  Barriers to discharge: Yes       Entered by: Susan Patterson 01/24/2019 11:27 PM     /82 (BP Location: Left arm)   Pulse 68   Temp 97.7  F (36.5  C) (Oral)   Resp 18   Ht 1.803 m (5' 11\")   Wt 100.1 kg (220 lb 9.6 oz)   SpO2 96%   BMI 30.77 kg/m         A&Ox4, Vitally stable on RA. x2 BM's since 7pm. Denies pain. Up independent in room, denies dizziness/lightheadedness. IVF infusing. Awaiting enteric panal results. Will continue to monitor.     Please review provider order for any additional goals.   Nurse to notify provider when observation goals have been met and patient is ready for discharge.      "

## 2019-01-25 NOTE — PLAN OF CARE
PRIMARY DIAGNOSIS: SYNCOPE/gastroenteritis  OUTPATIENT/OBSERVATION GOALS TO BE MET BEFORE DISCHARGE:  1. Orthostatic performed: Yes:          Lying Orthostatic BP: 130/75         Sitting Orthostatic BP: 122/72         Standing Orthostatic BP: 113/74     2. Diagnostic testing complete & at baseline neurologic testing: Yes, trops negative x 4    3. Cleared by consultants (if involved): No    4. Interpretation of cardiac rhythm per telemetry tech: SR/SB HR 50-60    5. Tolerating adequate PO diet and medications: Yes    6. Return to near baseline physical activity or neurologic status: Yes    Discharge Planner Nurse   Safe discharge environment identified: Yes  Barriers to discharge: Yes        Entered by: Susan Patterson 01/25/2019 1:10 AM       A&Ox4, Vitally stable. Denies pain. Up independent in room, denies dizziness/lightheadedness. IVF infusing. Will continue to monitor.     Please review provider order for any additional goals.   Nurse to notify provider when observation goals have been met and patient is ready for discharge.

## 2019-01-25 NOTE — PLAN OF CARE
"PRIMARY DIAGNOSIS: GASTROENTERITIS/NOROVIRUS     OUTPATIENT/OBSERVATION GOALS TO BE MET BEFORE DISCHARGE  1. Orthostatic performed: No    2. Tolerating PO fluid and/or antibiotics (if applicable): Yes    3. Nausea/Vomiting/Diarrhea symptoms improved: Yes, Last stool at 8pm on 1/24/19     4. Pain status: Pain free.    5. Return to near baseline physical activity: Yes    Discharge Planner Nurse   Safe discharge environment identified: Yes  Barriers to discharge: Yes       Entered by: Samia Alarcon 01/25/2019 10:43 AM   Pt alert and orientated. VSS. Tolerating regular diet. Last stool 1/24 at 8pm. IVF running per order. Up independently. Pt stated \"I feel better\". Will continue to monitor.   Please review provider order for any additional goals.   Nurse to notify provider when observation goals have been met and patient is ready for discharge.  "

## 2019-01-25 NOTE — DISCHARGE SUMMARY
[facility]    Discharge Summary  Hospitalist    Date of Admission:  1/23/2019  Date of Discharge:  1/25/2019  5:15 PM  Discharging Provider:  [facility]     Date of Service (when I saw the patient): 01/25/19    Discharge Diagnoses      1. Syncope  2. Acute diarrhea, attributed to Norovirus infection  3. Acute kidney injury  4. Incidental finding of communicating ventriculomegaly   5. Headache  6. Relative bradycardia  7. Anemia      History of Present Illness   From 1/23 H & P:  Josias Guevara is a 53 year old male healthy, not on any medications who exercises regularly.  He presents after a fainting episode having a haircut in a [employer] this afternoon.  He felt nauseated, with dizziness and lost consciousness.  When he woke up he was sweaty, diaphoretic.  He denies any warning signs.  No prior history.  His wife had recently acute diarrhea, his son is also having diarrhea and the patient himself started to have diarrhea today.  He reports of having 5 or 6 episodes of diarrhea since the morning.  He was not able to drink or eat anything, he feels upsetting in his stomach.  He denies fever.  No chest pain, no shortness of breath.  No urinary issues.  From my conversation with emergency department physician, on arrival he was significantly hypotensive in the orthostatic position, unable to tolerate it.  He is receiving IV fluid, feeling better but not at his baseline.  At the moment of my interview he denies any pain.  He is a still feeling weak.  He seems to be somnolent, participate in the interview but falls asleep easily.  His wife accompanying him in the emergency department.    Hospital Course   Josias Guevara was admitted on 1/23/2019.  The following problems were addressed during his hospitalization:    1. Syncope.  Likely related to dehydration in the setting of ongoing diarrhea.  Other causes are not totally excluded (bradycardia or possible bradyarrhythmia).  Orthostatic  Sx have resolved.  - Neurology consulted; see note by Dr. Galicia.   - monitored on telemetry     2. Acute diarrhea (household contact with similar symptoms); Stood testing was Positive for Norovirus. Stools now more formed.  Appetite much improved.  Treated w/ IVF initially; diet gradually advanced.    - trial of Lactinex begun; Eric wants to continue for now.     3. Acute kidney injury, in setting of mild chronic kidney disease; attributted to severe dehydration; Cr improved.     4. Incidental finding of communicating ventriculomegaly; reviewed w/ Neurology.  - no further eval. At present.     5. Headache, mild and resolved.  - had tylenol PRN     6. Relative bradycardia; unclear cause.  - monitored on telemetry  - 30-day event monitor to be started 1/28;     7. Anemia; mild. Hgb stable.      TERRI Guajardo MD, FACP     Hospitalist, [facility]    Significant Results and Procedures   See EHR    Pending Results   None    Unresulted Labs Ordered in the Past 30 Days of this Admission     No orders found from 11/24/2018 to 1/24/2019.          Code Status   Full Code       Primary Care Physician   Karan Vieira    Physical Exam   Temp: 98.3  F (36.8  C) Temp src: Oral BP: 144/86   Heart Rate: 51 Resp: 16 SpO2: 97 % O2 Device: None (Room air)    Vitals:    01/24/19 0101   Weight: 100.1 kg (220 lb 9.6 oz)     Vital Signs with Ranges  Temp:  [97.7  F (36.5  C)-98.3  F (36.8  C)] 98.3  F (36.8  C)  Heart Rate:  [51-74] 51  Resp:  [16-18] 16  BP: (134-144)/(64-86) 144/86  SpO2:  [97 %-98 %] 97 %  No intake/output data recorded.    Constitutional: no acute distress; sitting on side of bed  HEENT: sclerae clear; EOMI. MM's moist  Respiratory: Good air entry bilaterally, no wheezing or rhonchi  Cardiovascular: S1, S2 present, regular rate and rhythm, without murmur, rubs or gallops  GI: abd. Flat; positive bowel sounds, soft, non-tender, non-distended; no masses  Skin/Integumen: no edema, no cyanosis, no  dipti  Neurologic: awake, conversant; no focal deficits    Discharge Disposition   Discharged to home  Condition at discharge: Stable    Consultations This Hospital Stay   OCCUPATIONAL THERAPY ADULT IP CONSULT  PHYSICAL THERAPY ADULT IP CONSULT  SOCIAL WORK IP CONSULT  NEUROLOGY IP CONSULT    Time Spent on this Encounter   IPato, personally saw the patient today and spent greater than 30 minutes discharging this patient.    Discharge Orders      Reason for your hospital stay    You were evaluated for acute diarrheal illness and other medical issues, and have made good progress.     Follow-up and recommended labs and tests     1. Follow up with primary care provider, Karan Vieira, within 7 days to evaluate medication change and for hospital follow- up.  The following labs/tests are recommended: BMP.    2. Call the Event Monitor Department at [facility] 1/28 to schedule placement of 30-day event monitor.  Results should be reviewed by PCP when done.     Activity    Your activity upon discharge: activity as tolerated     Full Code     Diet    Follow this diet upon discharge: Orders Placed This Encounter      Advance Diet as Tolerated: Regular Diet Adult     Discharge Medications   Discharge Medication List as of 1/25/2019  4:58 PM      CONTINUE these medications which have CHANGED    Details   lactobacillus rhamnosus, GG, (CULTURELL) capsule Take 1 capsule by mouth 3 times daily as needed (Diarrhea), Disp-21 capsule, R-1, Local Print           Allergies   No Known Allergies  Data   Most Recent 3 CBC's:  Recent Labs   Lab Test 01/25/19  0633 01/24/19  0631 01/23/19  1832 07/14/13  1855   WBC  --  5.6 6.9 10.5   HGB 13.3 12.7* 14.6 12.7*   MCV  --  83 84 82   PLT  --  160 194 184      Most Recent 3 BMP's:  Recent Labs   Lab Test 01/25/19  0633 01/24/19  0631 01/23/19  1832    139 139   POTASSIUM 4.1 3.9 4.4   CHLORIDE 108 110* 107   CO2 28 23 26   BUN 19 18 24   CR 1.29* 1.19  1.42*   ANIONGAP 4 6 6   ELAINE 8.4* 7.8* 8.4*   GLC 97 96 95     Most Recent 2 LFT's:  Recent Labs   Lab Test 01/23/19  1832   AST 46*   ALT 44   ALKPHOS 57   BILITOTAL 0.7     Most Recent INR's and Anticoagulation Dosing History:  Anticoagulation Dose History     There is no flowsheet data to display.        Most Recent 3 Troponin's:  Recent Labs   Lab Test 01/24/19  1018 01/24/19  0631 01/24/19  0206   TROPI <0.015 <0.015 <0.015     Most Recent Cholesterol Panel:No lab results found.  Most Recent 6 Bacteria Isolates From Any Culture (See EPIC Reports for Culture Details):No lab results found.  Most Recent TSH, T4 and A1c Labs:No lab results found.  Results for orders placed or performed during the hospital encounter of 01/23/19   CT Head w/o Contrast    Narrative    CT SCAN OF THE HEAD WITHOUT CONTRAST   1/23/2019 7:28 PM     HISTORY: Headache, syncope.    TECHNIQUE:  Axial images of the head and coronal reformations without  IV contrast material. Radiation dose for this scan was reduced using  automated exposure control, adjustment of the mA and/or kV according  to patient size, or iterative reconstruction technique.    COMPARISON: None.    FINDINGS: The bilateral lateral, third, and fourth ventricles are  mildly enlarged out of proportion to the degree of sulcal enlargement.  The cerebral hemispheres, brainstem, and cerebellum otherwise  demonstrate normal morphology and attenuation. No evidence of acute  ischemia, hemorrhage, mass, mass effect, or hydrocephalus. The  visualized calvarium, skull base, paranasal sinuses, and extracranial  soft tissues are unremarkable.      Impression    IMPRESSION: Nonspecific mild communicating ventriculomegaly, age  indeterminate. If there is concern for meningitis, lumbar puncture  could be pursued.    CONY HARRIS MD   XR Chest 2 Views    Narrative    CHEST TWO VIEWS   1/23/2019 7:29 PM     HISTORY: Syncope    COMPARISON: None.      Impression    IMPRESSION: No acute  abnormality. Lungs are well-inflated and clear.  Heart size is normal.    CONY HARRIS MD

## 2019-01-26 NOTE — PLAN OF CARE
Patient's After Visit Summary was reviewed with patient.   Patient verbalized understanding of After Visit Summary, recommended follow up and was given an opportunity to ask questions.   Discharge medications sent home with patient/family: Written prescription was sent home with pt to have filled at his pharmacy of choice.    Discharged with spouse in private transportation to home.    OBSERVATION patient END time: 9304

## 2019-02-06 ENCOUNTER — HOSPITAL ENCOUNTER (OUTPATIENT)
Dept: CARDIOLOGY | Facility: CLINIC | Age: 54
Discharge: HOME | End: 2019-02-06
Attending: PHYSICIAN ASSISTANT | Admitting: PHYSICIAN ASSISTANT
Payer: COMMERCIAL

## 2019-02-06 DIAGNOSIS — R55 SYNCOPE, UNSPECIFIED SYNCOPE TYPE: ICD-10-CM

## 2019-02-06 PROCEDURE — 93272 ECG/REVIEW INTERPRET ONLY: CPT | Performed by: INTERNAL MEDICINE

## 2019-02-06 PROCEDURE — 93270 REMOTE 30 DAY ECG REV/REPORT: CPT

## 2019-11-07 ENCOUNTER — HEALTH MAINTENANCE LETTER (OUTPATIENT)
Age: 54
End: 2019-11-07

## 2020-11-29 ENCOUNTER — HEALTH MAINTENANCE LETTER (OUTPATIENT)
Age: 55
End: 2020-11-29

## 2021-09-25 ENCOUNTER — HEALTH MAINTENANCE LETTER (OUTPATIENT)
Age: 56
End: 2021-09-25

## 2022-01-15 ENCOUNTER — HEALTH MAINTENANCE LETTER (OUTPATIENT)
Age: 57
End: 2022-01-15

## 2022-12-26 ENCOUNTER — HEALTH MAINTENANCE LETTER (OUTPATIENT)
Age: 57
End: 2022-12-26

## 2023-04-16 ENCOUNTER — HEALTH MAINTENANCE LETTER (OUTPATIENT)
Age: 58
End: 2023-04-16